# Patient Record
Sex: MALE | Race: ASIAN | NOT HISPANIC OR LATINO | Employment: OTHER | URBAN - METROPOLITAN AREA
[De-identification: names, ages, dates, MRNs, and addresses within clinical notes are randomized per-mention and may not be internally consistent; named-entity substitution may affect disease eponyms.]

---

## 2019-08-13 PROBLEM — E23.0 PANHYPOPITUITARISM: Status: ACTIVE | Noted: 2019-08-13

## 2019-08-13 PROBLEM — E78.2 MIXED HYPERLIPIDEMIA: Status: ACTIVE | Noted: 2019-08-13

## 2020-05-01 ENCOUNTER — TELEPHONE (OUTPATIENT)
Dept: PHARMACY | Facility: CLINIC | Age: 30
End: 2020-05-01

## 2020-05-01 NOTE — TELEPHONE ENCOUNTER
Informed Patient  that [facility] received prescription for Epidiolex and prior authorization is required.  [facility] will be back in touch once insurance determination is received.

## 2020-05-06 NOTE — TELEPHONE ENCOUNTER
Initial consult for Epidiolex completed with patient's mother Shahida on 20. Medication will be sent via FedEx on  to arrive on  with patient consent. Address verified, acknowledged signature required. 28 day supply. $ 0 copay. First dose anticipated on . Confirmed 2 patient identifiers - name and . Therapy Appropriate.  verified.    Indication: treatment of seizure disorders  Goals of Treatment: reduction in seizure activity  Current seizure frequency: 6-8 month in clusters of 2-3  Missed days of school/work: N/A PT and OT only    Counseled patient on administration directions:  · Dose:  · Initial dose 175mg BID (2.2mg/kg):  · No titration at this time - mother aware of titration approach for future.   · When discontinuing, the dose should be decreased gradually; avoid abrupt discontinuation.   Administration: Oral;   o Food may affect Epidiolex levels; Administer consistently in a fasted or fed state, at approximately the same times each day.   o Administer using the provided 1 or 5 mL oral syringe NOT household spoon.   o 100ml bottles with clear - yellow solution  o Strawberry flavored (artificial)   Storage/Stability: Store at ROOM TEMPERATURE. Do not refrigerate or freeze. Use within 12 weeks of first opening the bottle; discard any unused solution.    Patient was counseled on possible side effects which include, but are not limited to:  o Central nervous system: Drowsiness (?32%), sedation (?32%), fatigue, sleep disturbance (?11%) caution with other CNS depressants   o Depression and Suicidal Ideation, monitor for changes in mood  o Dermatologic: Skin rash (7% to 13%)  o Decreased appetite (16% to 22%), diarrhea (9% to 20%), Weight loss (3% to 18%)  o Changes in liver enzymes:   - Monitoring: LFTs (baseline and 1, 3, and 6 months after initiation, then as needed).  - Signs/Symptoms: yellowing of the skin or the whites of eyes, unusual darkening of urine, right upper stomach pain, N/V,  fever, unusual tiredness  o Infection: Infection (25% to 41%)  o Central nervous system: Agitation (?9%), irritability (?9%)  o Hypersensitivity: Hypersensitivity reaction (Frequency not defined)    DDIs:  Medication list reviewed. No significant DDIs with Epidiolex encountered    Patient informed of [facility] hours, refill procedures, and on-call pharmacist 24 hours a day. Patient verbalized understanding. Patient advised to call myself or provider should any questions arise. Consultation included: the importance of compliance; the importance of laboratory monitoring; the importance of keeping all follow up appointments.  Patient understands to report any medication changes to [facility] and provider. All questions answered and addressed to patients satisfaction. [facility] to contact patient in 3 weeks for refills.

## 2020-05-07 NOTE — TELEPHONE ENCOUNTER
Spoke to mother Shahida and provided with lab phone number [phone] and address [address] [place] [address]. Confirmed that orders are in and patient can walk in for appt at anytime. Mother acknowledged that they will not start the Epidiolex until lab results received.

## 2020-07-01 ENCOUNTER — TELEPHONE (OUTPATIENT)
Dept: PHARMACY | Facility: CLINIC | Age: 30
End: 2020-07-01

## 2020-07-01 NOTE — TELEPHONE ENCOUNTER
Refill and followup call for Epidiolex. Patient confirmed need of the refill. Will deliver via FedEx on  to arrive on  with patient consent. Copay $0.00 at 004. Address confirmed. Patient has 18 doses remaining (9 day supply). Patient denies missed doses and no side effects.  No new medications/allergies/medical conditions. Labs are stable. No ER/Urgent care visits in past month. Patient taking the medication as directed. Patient denies any further questions. Confirmed 2 patient identifiers - Name and .  verified.    Magdi Stewart, PharmD  Clinical Pharmacist  [facility]  P: [phone]

## 2020-07-29 ENCOUNTER — TELEPHONE (OUTPATIENT)
Dept: PHARMACY | Facility: CLINIC | Age: 30
End: 2020-07-29

## 2020-07-29 NOTE — TELEPHONE ENCOUNTER
Call for Epidiolex refill. Mother answered, verified patient name and . Rx currently out of refills, sent refill request electronically and called for verbal refill request and left message. Mother states that patient has about 1/3rd of a bottle remaining, 9 days of medication left. Informed mother that [facility] would call back once refill was received. Mother acknowledged. Mother asked what the difference was with Epidiolex and OTC CBD oil. Explained that OTC CBD oil has not been guaranteed for purity and strength, but that they could be otherwise similar. Mother states that she prefers something that she knows will work and be the same strength every time. No other questions or concerns.     Magdi Stewart, PharmD  Clinical Pharmacist  [facility]  P: [phone]

## 2020-07-31 NOTE — TELEPHONE ENCOUNTER
Refill and followup call for Epidiolex. Patient's mother confirmed need of the refill. Will deliver via FedEx on  to arrive on  with patient consent. Copay $0.00 at 004. Address confirmed. Patient has 14 doses remaining (7 day supply). Mother denies missed doses and no side effects.  No new medications/allergies/medical conditions. Labs are stable. No ER/Urgent care visits in past month. Patient taking the medication as directed. Mother denies any further questions. Confirmed 2 patient identifiers - Name and .  verified.    Magdi Stewart, PharmD  Clinical Pharmacist  [facility]  P: [phone]

## 2020-08-17 PROBLEM — Z96.89 S/P PLACEMENT OF VNS (VAGUS NERVE STIMULATION) DEVICE: Status: ACTIVE | Noted: 2020-08-17

## 2020-08-17 PROBLEM — G47.19: Status: ACTIVE | Noted: 2020-08-17

## 2020-08-17 PROBLEM — G40.911 INTRACTABLE EPILEPSY WITH STATUS EPILEPTICUS: Status: ACTIVE | Noted: 2020-08-17

## 2020-08-17 PROBLEM — R41.89 COGNITIVE IMPAIRMENT: Status: ACTIVE | Noted: 2020-08-17

## 2020-08-26 ENCOUNTER — TELEPHONE (OUTPATIENT)
Dept: PHARMACY | Facility: CLINIC | Age: 30
End: 2020-08-26

## 2020-08-26 NOTE — TELEPHONE ENCOUNTER
Refill call for Epidiolex. Patient confirmed need of the refill. Will deliver via FedEx on  to arrive on  with patient consent- confirmed. Signature required Copay $ 0 at 004. Address confirmed. Patient has 16 doses remaining (8 day supply). Patient denies missed doses and no side effects.  No new medications/allergies/medical conditions. No ER/Urgent care visits in past month. Patient taking the medication as directed. Patient denies any further questions. Confirmed 2 patient identifiers - Name and .  verified.    Francis Howard, PharmD  [facility]

## 2020-09-24 ENCOUNTER — TELEPHONE (OUTPATIENT)
Dept: PHARMACY | Facility: CLINIC | Age: 30
End: 2020-09-24

## 2020-09-24 NOTE — TELEPHONE ENCOUNTER
Refill call for Epidiolex. Patient confirmed need of the refill. Will deliver via FedEx on  to arrive on  with patient consent. Copay $0.00 at 004. Address confirmed. Patient has 33ml remaining (9 day supply). Patient denies missed doses and no side effects.  No new medications/allergies/medical conditions. Labs are stable, due for labs at 6 months. Will message provider. No ER/Urgent care visits in past month. Patient taking the medication as directed. Patient denies any further questions. Confirmed 2 patient identifiers - Name and .    Magdi Stewart, PharmD  Clinical Pharmacist  [facility]  P: [phone]

## 2020-10-23 ENCOUNTER — SPECIALTY PHARMACY (OUTPATIENT)
Dept: PHARMACY | Facility: CLINIC | Age: 30
End: 2020-10-23

## 2020-10-23 NOTE — TELEPHONE ENCOUNTER
Upon speaking with patient's mother for Epidiolex refill, she reports a half bottle on hand at this time which is a 14 a day supply. Patient's mother denies any missed doses and states we have always sent it a week early, so that is why they have extra. She agreed for [facility] call back in a week to schedule the refill.    Romero Dobbins, PharmD  Clinical Pharmacist   [facility]   P: [phone]

## 2020-11-02 ENCOUNTER — SPECIALTY PHARMACY (OUTPATIENT)
Dept: PHARMACY | Facility: CLINIC | Age: 30
End: 2020-11-02

## 2020-11-02 DIAGNOSIS — G40.211 PARTIAL SYMPTOMATIC EPILEPSY WITH COMPLEX PARTIAL SEIZURES, INTRACTABLE, WITH STATUS EPILEPTICUS: Primary | ICD-10-CM

## 2020-11-02 NOTE — TELEPHONE ENCOUNTER
Specialty Pharmacy - Refill Coordination    Specialty Medication Orders Linked to Encounter      Most Recent Value   Medication #1  cannabidioL (EPIDIOLEX) 100 mg/mL (Order#[id number], Rx#[id number])        Refill Questions - Documented Responses      Most Recent Value   Relationship to patient of person spoken to?  Mother   HIPAA/medical authority confirmed?  Yes   Any changes in contact preferences or allowed representatives?  No   Has the patient had any insurance changes?  No   Has the patient had any changes to specialty medication, dose, or instructions?  No   Has the patient started taking any new medications, herbals, or supplements?  No   Has the patient been diagnosed with any new medical conditions?  No   Does the patient have any new allergies to medications or foods?  No   Does the patient have any concerns about side effects?  No   Can the patient store medication/sharps container properly (at the correct temperature, away from children/pets, etc.)?  Yes   Can the patient call emergency services (911) in the event of an emergency?  Yes   Does the patient have any concerns or questions about taking or administering this medication as prescribed?  No   How many doses did the patient miss in the past 4 weeks or since the last fill?  0   How many doses does the patient have on hand?  14   How many days does the patient report on hand quantity will last?  7   Does the number of doses/days supply remaining match pharmacy expected amounts?  Yes   How will the patient receive the medication?  Mail   When does the patient need to receive the medication?  11/09/20   Shipping Address  Home   Address in [facility] confirmed and updated if neccessary?  Yes   Expected Copay ($)  0   Is the patient able to afford the medication copay?  Yes   Payment Method  zero copay   Days supply of Refill  28   Would patient like to speak to a pharmacist?  No   Do you want to trigger an intervention?  No   Do you want to  trigger an additional referral task?  No   Refill activity completed?  Yes   Refill activity plan  Refill scheduled   Shipment/Pickup Date:  11/04/20        Tasks added this encounter   11/30/2020 - Refill Call (Auto Added)   Tasks due within next 3 months   11/6/2020 - Clinical - Follow Up Assesement (90 day)     Patient's mother reports about a quarter of a bottle remaining on hand at this time (25 mLs or 7 days of therapy).  checked. No questions or concerns.     Romero Dobbins, Brenna  [facility]  [address] [place] [id number]  Phone: [phone]  Fax: [phone]

## 2020-11-06 ENCOUNTER — SPECIALTY PHARMACY (OUTPATIENT)
Dept: PHARMACY | Facility: CLINIC | Age: 30
End: 2020-11-06

## 2020-11-06 DIAGNOSIS — G40.211 PARTIAL SYMPTOMATIC EPILEPSY WITH COMPLEX PARTIAL SEIZURES, INTRACTABLE, WITH STATUS EPILEPTICUS: Primary | ICD-10-CM

## 2020-11-06 NOTE — TELEPHONE ENCOUNTER
"Specialty Pharmacy - Clinical Reassessment    Specialty Medication Orders Linked to Encounter      Most Recent Value   Medication #1  cannabidioL (EPIDIOLEX) 100 mg/mL (Order#[id number], Rx#[id number])        Subjective    Ciro Sloan is a 30 y.o. male, who is followed by the specialty pharmacy service for management and education.    Encounters since last clinical assessment   10/23/2020: Refill Coordination with Romero Dobbins PharmD  11/2/2020: Refill Coordination with Romero Dobbins PharmD   Clinical call attempts since last clinical assessment   No call attempts found.     Today he received follow up education for his specialty medication(s).    Current Outpatient Medications   Medication Sig    cannabidioL (EPIDIOLEX) 100 mg/mL Take 1.75 mLs (175 mg total) by mouth 2 (two) times a day.    cloBAZam (ONFI) 20 mg Tab Take 1 tablet (20 mg total) by mouth 2 (two) times daily.    clonazePAM (KLONOPIN) 2 MG Tab Take 1 tablet (2 mg total) by mouth once as needed (For prolonged seizures or cluster seizures).    hydrocortisone (CORTEF) 5 MG Tab Take 1 tablet (5 mg total) by mouth 3 (three) times daily.    lamotrigine XR (LAMICTAL XR) 300 mg XR tablet Take 1 tablet (300 mg total) by mouth 2 (two) times a day.    levothyroxine (SYNTHROID) 137 MCG Tab tablet Take 1 tablet (137 mcg total) by mouth before breakfast.    loratadine (CLARITIN) 10 mg tablet Take 10 mg by mouth.    melatonin 10 mg Tab Take 10 mg by mouth.    methylphenidate HCl (RITALIN) 20 MG tablet Take 1 tablet (20 mg total) by mouth once daily AND 2 tablets (40 mg total) with lunch. Fill prescription no sooner than  10/13/2020.    multivitamin (THERAGRAN) per tablet Take 1 tablet by mouth.    needle, disp, 18 G (BD PRECISIONGLIDE NON-STERILE) 18 gauge x 1 1/2" Ndle 1 Units by Misc.(Non-Drug; Combo Route) route 2 (two) times daily.    needle, disp, 23 gauge 23 gauge x 1 1/2" Ndle 1 Units by Misc.(Non-Drug; Combo Route) route 2 (two) times " daily.    omeprazole (PRILOSEC) 20 MG capsule Take 20 mg by mouth.    testosterone cypionate (DEPOTESTOTERONE CYPIONATE) 100 mg/mL injection Inject 150 mg into the muscle.    VIMPAT 200 mg Tab tablet Take 1 tablet (200 mg total) by mouth every 12 (twelve) hours.    vitamin D (VITAMIN D3) 1000 units Tab Take 1,000 Units by mouth.   Last reviewed on 11/6/2020 12:21 PM by Romero Dobbins, PharmD    Review of patient's allergies indicates:  No Known AllergiesLast reviewed on  11/6/2020 12:20 PM by Romero Dobbins    Drug Interactions    Drug interactions evaluated: yes  Clinically relevant drug interactions identified: no  Provided the patient with educational material regarding drug interactions: not applicable       Medication Adherence    Patient reported X missed doses in the last month: 0  Any gaps in refill history greater than 2 weeks in the last 3 months: no  Demonstrates understanding of importance of adherence: yes  Informant: mother  Reliability of informant: reliable  Provider-estimated medication adherence level: 0-25%  Reasons for non-adherence: no problems identified  Support network for adherence: family member  Confirmed plan for next specialty medication refill: delivery by pharmacy  Refills needed for supportive medications: not needed       Adverse Effects    *All other systems reviewed and are negative       Assessment Questions - Documented Responses      Most Recent Value   Assessment   Medication Reconciliation completed for patient  Yes   During the past 4 weeks, has patient missed any activities due to condition or medication?  No   During the past 4 weeks, did patient have any of the following urgent care visits?  None   Goals of Therapy Status  Achieving   Welcome packet contents reviewed and discussed with patient?  No   Assesment completed?  Yes   Plan  Therapy continued   Do you need to open a clinical intervention (i-vent)?  No   Do you want to schedule first shipment?  No    Medication #1 Assessment Info   Patient status  Existing medication   Is this medication appropriate for the patient?  Yes   Is this medication effective?  Yes        Objective    He has a past medical history of Adrenal insufficiency, Epilepsy, Hearing impaired, Hydrocephalus, Hyperlipidemia, Hypopituitarism, Hypothyroidism, Medulloblastoma, and Short-term memory loss.    BP Readings from Last 4 Encounters:   08/17/20 119/76   03/02/20 116/76   08/12/19 107/76     Ht Readings from Last 4 Encounters:   08/17/20 6' (1.829 m)   03/02/20 6' (1.829 m)   08/12/19 6' (1.829 m)     Wt Readings from Last 4 Encounters:   08/17/20 83.9 kg (185 lb)   03/02/20 79.4 kg (175 lb)   08/12/19 76.2 kg (168 lb)       The goals of prescribed drug therapy management include:  · Supporting patient to meet the prescriber's medical treatment objectives  · Improving or maintaining quality of life  · Maintaining optimal therapy adherence  · Minimizing and managing side effects      Goals of Therapy Status: Achieving    Assessment/Plan  Patient plans to continue therapy without changes      Indication, dosage, appropriateness, effectiveness, safety and convenience of his specialty medication(s) were reviewed today.     Patient Counseling    Counseled the patient on the following: lab monitoring and follow-up discussed       Clinical follow up completed with patient's mother, Shahida. Patient is taking the medication as prescribed (1.75 mLs BID). No adherence issues noted. Patient's mother denies any side effects experienced including drowsiness, skin rash, irritability/anger, infection, or diarrhea. In regards to his seizures, patient has seen significant improvement since starting Epidiolex. Frequency at baseline was 6-8 seizures per month. Now the patient goes 21-30 days between seizure episodes. Additionally, patient's mother reports his N/V has completely resolved and he has not thrown up once while on Epidiolex therapy. This was a big  issue impacting the patient's QOL previously and his mother is extremely happy it is no longer occurring. She also notes improvement in his thought process as he is not having as many seizures. Overall, patient doing well clinically. Pertinent labs last completed on 11/4/20 - LFTs WNL (AST 13 ALT 25). Informed patient's mother labs will continued to be monitored on routine basis. Medication list reviewed and reconciled - no longer taking Restoril (Klonopin has replaced) and Periactin (no longer having N/V). Allergies and conditions reviewed. No CIs or DDIs encountered. Therapy appropriate to continue to positive clinical response. Patient's mother had no further questions or concerns and declined further consult/disease education.      Tasks added this encounter   1/28/2021 - Clinical - Follow Up Assesement (90 day)   Tasks due within next 3 months   11/30/2020 - Refill Call (Auto Added)     Romero Dobbins PharmD  [facility]  [address] [place] [id number]  Phone: [phone]  Fax: [phone]

## 2020-11-30 ENCOUNTER — SPECIALTY PHARMACY (OUTPATIENT)
Dept: PHARMACY | Facility: CLINIC | Age: 30
End: 2020-11-30

## 2020-11-30 DIAGNOSIS — G40.211 PARTIAL SYMPTOMATIC EPILEPSY WITH COMPLEX PARTIAL SEIZURES, INTRACTABLE, WITH STATUS EPILEPTICUS: Primary | ICD-10-CM

## 2020-11-30 NOTE — TELEPHONE ENCOUNTER
Specialty Pharmacy - Refill Coordination    Specialty Medication Orders Linked to Encounter      Most Recent Value   Medication #1  cannabidioL (EPIDIOLEX) 100 mg/mL (Order#[id number], Rx#[id number])        Refill Questions - Documented Responses      Most Recent Value   HIPAA/medical authority confirmed?  Yes   Any changes in contact preferences or allowed representatives?  No   Has the patient had any insurance changes?  No   Has the patient had any changes to specialty medication, dose, or instructions?  No   Has the patient started taking any new medications, herbals, or supplements?  No   Has the patient been diagnosed with any new medical conditions?  No   Does the patient have any new allergies to medications or foods?  No   Does the patient have any concerns about side effects?  No   Can the patient store medication/sharps container properly (at the correct temperature, away from children/pets, etc.)?  Yes   Can the patient call emergency services (911) in the event of an emergency?  Yes   Does the patient have any concerns or questions about taking or administering this medication as prescribed?  No   How many doses did the patient miss in the past 4 weeks or since the last fill?  0   How many doses does the patient have on hand?  18   How many days does the patient report on hand quantity will last?  9   Does the number of doses/days supply remaining match pharmacy expected amounts?  Yes   Does the patient feel that this medication is effective?  Yes   During the past 4 weeks, has patient missed any activities due to condition or medication?  No   During the past 4 weeks, did patient have any of the following urgent care visits?  None   How will the patient receive the medication?  Mail   When does the patient need to receive the medication?  12/09/20   Shipping Address  Home   Address in [facility] confirmed and updated if neccessary?  Yes   Expected Copay ($)  0   Is the patient able to afford the  medication copay?  Yes   Payment Method  zero copay   Days supply of Refill  28   Would patient like to speak to a pharmacist?  No   Do you want to trigger an intervention?  No   Do you want to trigger an additional referral task?  No   Refill activity completed?  Yes   Refill activity plan  Refill scheduled   Shipment/Pickup Date:  12/03/20        Tasks added this encounter   12/30/2020 - Refill Call (Auto Added)   Tasks due within next 3 months   1/28/2021 - Clinical - Follow Up Assesement (90 day)     Romero Dobbins PharmD  [facility]  [address] [place] [id number]  Phone: [phone]  Fax: [phone]

## 2020-12-30 ENCOUNTER — SPECIALTY PHARMACY (OUTPATIENT)
Dept: PHARMACY | Facility: CLINIC | Age: 30
End: 2020-12-30

## 2020-12-30 DIAGNOSIS — G40.211 PARTIAL SYMPTOMATIC EPILEPSY WITH COMPLEX PARTIAL SEIZURES, INTRACTABLE, WITH STATUS EPILEPTICUS: Primary | ICD-10-CM

## 2020-12-30 NOTE — TELEPHONE ENCOUNTER
Informed patient's mother that Epidiolex Rx is now requiring a PA. She reports having approximately 1/3rd bottle remaining on hand at this time (~7 days). Completed PA over the phone as URGENT and rep states there is a turnaround time of 24 hours.    PA Case ID: [id number]

## 2020-12-31 NOTE — TELEPHONE ENCOUNTER
Specialty Pharmacy - Refill Coordination  Specialty Pharmacy - Medication/Referral Authorization    Specialty Medication Orders Linked to Encounter      Most Recent Value   Medication #1  cannabidioL (EPIDIOLEX) 100 mg/mL (Order#[id number], Rx#[id number])        Refill Questions - Documented Responses      Most Recent Value   Relationship to patient of person spoken to?  Mother   HIPAA/medical authority confirmed?  Yes   Any changes in contact preferences or allowed representatives?  No   Has the patient had any insurance changes?  No   Has the patient had any changes to specialty medication, dose, or instructions?  No   Has the patient started taking any new medications, herbals, or supplements?  No   Has the patient been diagnosed with any new medical conditions?  No   Does the patient have any new allergies to medications or foods?  No   Does the patient have any concerns about side effects?  No   Can the patient store medication/sharps container properly (at the correct temperature, away from children/pets, etc.)?  Yes   Can the patient call emergency services (911) in the event of an emergency?  Yes   Does the patient have any concerns or questions about taking or administering this medication as prescribed?  No   How many doses did the patient miss in the past 4 weeks or since the last fill?  0   How many doses does the patient have on hand?  12   How many days does the patient report on hand quantity will last?  6   Does the number of doses/days supply remaining match pharmacy expected amounts?  Yes   Does the patient feel that this medication is effective?  Yes   During the past 4 weeks, has patient missed any activities due to condition or medication?  No   During the past 4 weeks, did patient have any of the following urgent care visits?  None   How will the patient receive the medication?  Mail   When does the patient need to receive the medication?  01/06/21   Shipping Address  Home   Address in [facility] confirmed and updated if neccessary?  Yes   Expected Copay ($)  0   Is the patient able to afford the medication copay?  Yes   Payment Method  zero copay   Days supply of Refill  28   Would patient like to speak to a pharmacist?  No   Do you want to trigger an intervention?  No   Do you want to trigger an additional referral task?  No   Refill activity completed?  Yes   Refill activity plan  Refill scheduled   Shipment/Pickup Date:  01/04/21        Romero Dobbins PharmD  [facility]  [address] [place] [id number]  Phone: [phone]  Fax: [phone]

## 2021-02-03 ENCOUNTER — SPECIALTY PHARMACY (OUTPATIENT)
Dept: PHARMACY | Facility: CLINIC | Age: 31
End: 2021-02-03

## 2021-02-03 DIAGNOSIS — G40.211 PARTIAL SYMPTOMATIC EPILEPSY WITH COMPLEX PARTIAL SEIZURES, INTRACTABLE, WITH STATUS EPILEPTICUS: Primary | ICD-10-CM

## 2021-03-05 ENCOUNTER — SPECIALTY PHARMACY (OUTPATIENT)
Dept: PHARMACY | Facility: CLINIC | Age: 31
End: 2021-03-05

## 2021-03-05 DIAGNOSIS — G40.211 PARTIAL SYMPTOMATIC EPILEPSY WITH COMPLEX PARTIAL SEIZURES, INTRACTABLE, WITH STATUS EPILEPTICUS: Primary | ICD-10-CM

## 2021-04-01 ENCOUNTER — SPECIALTY PHARMACY (OUTPATIENT)
Dept: PHARMACY | Facility: CLINIC | Age: 31
End: 2021-04-01

## 2021-04-01 DIAGNOSIS — G40.211 PARTIAL SYMPTOMATIC EPILEPSY WITH COMPLEX PARTIAL SEIZURES, INTRACTABLE, WITH STATUS EPILEPTICUS: Primary | ICD-10-CM

## 2021-05-07 ENCOUNTER — SPECIALTY PHARMACY (OUTPATIENT)
Dept: PHARMACY | Facility: CLINIC | Age: 31
End: 2021-05-07

## 2021-05-07 DIAGNOSIS — G40.211 PARTIAL SYMPTOMATIC EPILEPSY WITH COMPLEX PARTIAL SEIZURES, INTRACTABLE, WITH STATUS EPILEPTICUS: Primary | ICD-10-CM

## 2021-06-11 ENCOUNTER — SPECIALTY PHARMACY (OUTPATIENT)
Dept: PHARMACY | Facility: CLINIC | Age: 31
End: 2021-06-11

## 2021-06-11 DIAGNOSIS — G40.211 PARTIAL SYMPTOMATIC EPILEPSY WITH COMPLEX PARTIAL SEIZURES, INTRACTABLE, WITH STATUS EPILEPTICUS: Primary | ICD-10-CM

## 2021-06-22 PROBLEM — G40.919 REFRACTORY EPILEPSY: Status: ACTIVE | Noted: 2020-08-17

## 2021-07-16 ENCOUNTER — SPECIALTY PHARMACY (OUTPATIENT)
Dept: PHARMACY | Facility: CLINIC | Age: 31
End: 2021-07-16

## 2021-07-16 DIAGNOSIS — G40.919 REFRACTORY EPILEPSY: Primary | ICD-10-CM

## 2021-08-13 ENCOUNTER — SPECIALTY PHARMACY (OUTPATIENT)
Dept: PHARMACY | Facility: CLINIC | Age: 31
End: 2021-08-13

## 2021-09-15 ENCOUNTER — SPECIALTY PHARMACY (OUTPATIENT)
Dept: PHARMACY | Facility: CLINIC | Age: 31
End: 2021-09-15

## 2021-09-17 ENCOUNTER — SPECIALTY PHARMACY (OUTPATIENT)
Dept: PHARMACY | Facility: CLINIC | Age: 31
End: 2021-09-17

## 2021-10-15 ENCOUNTER — SPECIALTY PHARMACY (OUTPATIENT)
Dept: PHARMACY | Facility: CLINIC | Age: 31
End: 2021-10-15

## 2021-10-15 DIAGNOSIS — G40.919 REFRACTORY EPILEPSY: Primary | ICD-10-CM

## 2021-11-15 ENCOUNTER — SPECIALTY PHARMACY (OUTPATIENT)
Dept: PHARMACY | Facility: CLINIC | Age: 31
End: 2021-11-15

## 2021-11-15 DIAGNOSIS — G40.919 REFRACTORY EPILEPSY: Primary | ICD-10-CM

## 2021-12-13 ENCOUNTER — SPECIALTY PHARMACY (OUTPATIENT)
Dept: PHARMACY | Facility: CLINIC | Age: 31
End: 2021-12-13

## 2021-12-13 DIAGNOSIS — G40.919 REFRACTORY EPILEPSY: Primary | ICD-10-CM

## 2022-01-10 PROBLEM — E55.9 VITAMIN D DEFICIENCY: Status: ACTIVE | Noted: 2022-01-10

## 2022-01-10 PROBLEM — E03.8 SECONDARY HYPOTHYROIDISM: Status: ACTIVE | Noted: 2022-01-10

## 2022-01-10 PROBLEM — E27.49 SECONDARY ADRENAL INSUFFICIENCY: Status: ACTIVE | Noted: 2022-01-10

## 2022-01-10 PROBLEM — E23.0 HYPOGONADOTROPIC HYPOGONADISM: Status: ACTIVE | Noted: 2022-01-10

## 2022-01-12 ENCOUNTER — SPECIALTY PHARMACY (OUTPATIENT)
Dept: PHARMACY | Facility: CLINIC | Age: 32
End: 2022-01-12

## 2022-01-12 DIAGNOSIS — G40.919 REFRACTORY EPILEPSY: Primary | ICD-10-CM

## 2022-01-12 NOTE — TELEPHONE ENCOUNTER
Specialty Pharmacy - Refill Coordination    Specialty Medication Orders Linked to Encounter    Flowsheet Row Most Recent Value   Medication #1 cannabidioL (EPIDIOLEX) 100 mg/mL (Order#[id number], Rx#[id number])        Refill Questions - Documented Responses    Flowsheet Row Most Recent Value   Patient Availability and HIPAA Verification    Does patient want to proceed with activity? Yes   HIPAA/medical authority confirmed? Yes   Relationship to patient of person spoken to? Mother   Refill Screening Questions    Changes to allergies? No   Changes to medications? No   New conditions since last clinic visit? No   Unplanned office visit, urgent care, ED, or hospital admission in the last 4 weeks? No   How does patient/caregiver feel medication is working? Very good   Financial problems or insurance changes? No   How many doses of your specialty medications were missed in the last 4 weeks? 0   Would patient like to speak to a pharmacist? No   When does the patient need to receive the medication? 01/19/22   Refill Delivery Questions    How will the patient receive the medication? Mail   When does the patient need to receive the medication? 01/19/22   Shipping Address Home   Address in [facility] confirmed and updated if neccessary? Yes   Expected Copay ($) 0   Is the patient able to afford the medication copay? Yes   Payment Method zero copay   Days supply of Refill 28   Supplies needed? No supplies needed   Refill activity completed? Yes   Refill activity plan Refill scheduled   Shipment/Pickup Date: 01/13/22          Current Outpatient Medications   Medication Sig    cannabidioL (EPIDIOLEX) 100 mg/mL Take 1.75 mLs (175 mg total) by mouth 2 (two) times a day.    cloBAZam (ONFI) 20 mg Tab TAKE 1 TABLET(20 MG) BY MOUTH TWICE DAILY    clonazePAM (KLONOPIN) 2 MG Tab TAKE 1 TABLET BY MOUTH ONCE AS NEEDED FOR PROLONGED SEIZURES OR CLUSTER SEIZURES    hydrocortisone (CORTEF) 5 MG Tab Take 10 mg (2 tablets) in am and  "5 mg in pm (3 pm)    lamotrigine XR (LAMICTAL XR) 300 mg XR tablet TAKE 1 TABLET BY MOUTH TWICE DAILY    levothyroxine (SYNTHROID) 150 MCG tablet Take 1 tablet (150 mcg total) by mouth before breakfast.    loratadine (CLARITIN) 10 mg tablet Take 10 mg by mouth.    melatonin 10 mg Tab Take 10 mg by mouth.    methylphenidate HCl (RITALIN) 20 MG tablet Take 1 tablet (20 mg total) by mouth once daily AND 2 tablets (40 mg total) with lunch.    multivitamin (THERAGRAN) per tablet Take 1 tablet by mouth.    needle, disp, 18 G (BD PRECISIONGLIDE NON-STERILE) 18 gauge x 1 1/2" Ndle 1 Units by Misc.(Non-Drug; Combo Route) route 2 (two) times daily.    needle, disp, 23 gauge 23 gauge x 1 1/2" Ndle 1 Units by Misc.(Non-Drug; Combo Route) route 2 (two) times daily.    omeprazole (PRILOSEC) 20 MG capsule Take 20 mg by mouth.    rosuvastatin (CRESTOR) 20 MG tablet Take 20 mg by mouth once daily.    VIMPAT 200 mg Tab tablet Take 1 tablet (200 mg total) by mouth every 12 (twelve) hours.    vitamin D (VITAMIN D3) 1000 units Tab Take 1,000 Units by mouth.   Last reviewed on 1/10/2022  2:27 PM by Chaitanya Lamb MD    Review of patient's allergies indicates:  No Known Allergies Last reviewed on  1/10/2022 2:27 PM by Chaitanya Lamb      Tasks added this encounter   2/9/2022 - Refill Call (Auto Added)   Tasks due within next 3 months   No tasks due.     Romero Dobbins, PharmD  [facility] - [facility]ty Pharm[facility]  [address] [place] [id number]  Phone: [phone]  Fax: [phone]      "

## 2022-02-09 ENCOUNTER — SPECIALTY PHARMACY (OUTPATIENT)
Dept: PHARMACY | Facility: CLINIC | Age: 32
End: 2022-02-09

## 2022-02-09 NOTE — TELEPHONE ENCOUNTER
Patient's mother reports 75mL on hand (21 days). She denies any missed doses and confirms appropriate dose of 1.75mL BID.  She states there must have been more in the bottle. Patient's mother agreed for a call back in 2 weeks to schedule. Pending accordingly.

## 2022-02-23 ENCOUNTER — SPECIALTY PHARMACY (OUTPATIENT)
Dept: PHARMACY | Facility: CLINIC | Age: 32
End: 2022-02-23

## 2022-02-23 NOTE — TELEPHONE ENCOUNTER
Specialty Pharmacy - Refill Coordination    Specialty Medication Orders Linked to Encounter    Flowsheet Row Most Recent Value   Medication #1 cannabidioL (EPIDIOLEX) 100 mg/mL (Order#[id number], Rx#[id number])        Refill Questions - Documented Responses    Flowsheet Row Most Recent Value   Patient Availability and HIPAA Verification    Does patient want to proceed with activity? Yes   HIPAA/medical authority confirmed? Yes   Relationship to patient of person spoken to? Mother   Refill Screening Questions    Changes to allergies? No   Changes to medications? No   New conditions since last clinic visit? No   Unplanned office visit, urgent care, ED, or hospital admission in the last 4 weeks? No   How does patient/caregiver feel medication is working? Good   Financial problems or insurance changes? No   How many doses of your specialty medications were missed in the last 4 weeks? 0   Would patient like to speak to a pharmacist? No   When does the patient need to receive the medication? 03/04/22   Refill Delivery Questions    How will the patient receive the medication? Mail   When does the patient need to receive the medication? 03/04/22   Shipping Address Home   Address in [facility] confirmed and updated if neccessary? Yes   Expected Copay ($) 0   Is the patient able to afford the medication copay? Yes   Payment Method zero copay   Days supply of Refill 28   Supplies needed? No supplies needed   Refill activity completed? Yes   Refill activity plan Refill scheduled   Shipment/Pickup Date: 02/28/22          Current Outpatient Medications   Medication Sig    cannabidioL (EPIDIOLEX) 100 mg/mL Take 1.75 mLs (175 mg total) by mouth 2 (two) times a day.    cloBAZam (ONFI) 20 mg Tab TAKE 1 TABLET(20 MG) BY MOUTH TWICE DAILY    clonazePAM (KLONOPIN) 2 MG Tab TAKE 1 TABLET BY MOUTH ONCE AS NEEDED FOR PROLONGED SEIZURES OR CLUSTER SEIZURES    hydrocortisone (CORTEF) 5 MG Tab Take 10 mg (2 tablets) in am and 5 mg  "in pm (3 pm)    lamotrigine XR (LAMICTAL XR) 300 mg XR tablet TAKE 1 TABLET BY MOUTH TWICE DAILY    levothyroxine (SYNTHROID) 150 MCG tablet Take 1 tablet (150 mcg total) by mouth before breakfast.    loratadine (CLARITIN) 10 mg tablet Take 10 mg by mouth.    melatonin 10 mg Tab Take 10 mg by mouth.    methylphenidate HCl (RITALIN) 20 MG tablet Take 1 tablet (20 mg total) by mouth once daily AND 2 tablets (40 mg total) with lunch.    midazolam (NAYZILAM) 5 mg/spray (0.1 mL) Spry 5 mg by Nasal route as needed (to break seizure cluster. May repeat dose once at least 10 minutes after 1st dose.).    multivitamin (THERAGRAN) per tablet Take 1 tablet by mouth.    needle, disp, 18 G (BD PRECISIONGLIDE NON-STERILE) 18 gauge x 1 1/2" Ndle 1 Units by Misc.(Non-Drug; Combo Route) route 2 (two) times daily.    needle, disp, 23 gauge 23 gauge x 1 1/2" Ndle 1 Units by Misc.(Non-Drug; Combo Route) route 2 (two) times daily.    omeprazole (PRILOSEC) 20 MG capsule Take 20 mg by mouth.    rosuvastatin (CRESTOR) 20 MG tablet Take 20 mg by mouth once daily.    VIMPAT 200 mg Tab tablet TAKE 1 TABLET(200 MG) BY MOUTH EVERY 12 HOURS    vitamin D (VITAMIN D3) 1000 units Tab Take 1,000 Units by mouth.   Last reviewed on 1/10/2022  2:27 PM by Chaitanya Lamb MD    Review of patient's allergies indicates:  No Known Allergies Last reviewed on  1/18/2022 9:45 AM by Josefa Tellse      Tasks added this encounter   3/25/2022 - Refill Call (Auto Added)   Tasks due within next 3 months   No tasks due.     Romero Dobbins, PharmD  [facility]  [address] [place] [id number]  Phone: [phone]  Fax: [phone]      "

## 2022-03-28 ENCOUNTER — SPECIALTY PHARMACY (OUTPATIENT)
Dept: PHARMACY | Facility: CLINIC | Age: 32
End: 2022-03-28

## 2022-03-28 NOTE — TELEPHONE ENCOUNTER
Epidiolex refills received. Dose appropriate. Updated labs not required at this time. No PA required. Releasing Rx to [facility].    Labs 3/4/22: LFTs (AST 17 AST 15) WNL

## 2022-03-29 ENCOUNTER — SPECIALTY PHARMACY (OUTPATIENT)
Dept: PHARMACY | Facility: CLINIC | Age: 32
End: 2022-03-29

## 2022-03-29 NOTE — TELEPHONE ENCOUNTER
Specialty Pharmacy - Refill Coordination    Specialty Medication Orders Linked to Encounter    Flowsheet Row Most Recent Value   Medication #1 cannabidioL (EPIDIOLEX) 100 mg/mL (Order#[id number], Rx#[id number])          Refill Questions - Documented Responses    Flowsheet Row Most Recent Value   Patient Availability and HIPAA Verification    Does patient want to proceed with activity? Yes   HIPAA/medical authority confirmed? Yes   Relationship to patient of person spoken to? Mother   Refill Screening Questions    Changes to allergies? No   Changes to medications? No   New conditions since last clinic visit? No   Unplanned office visit, urgent care, ED, or hospital admission in the last 4 weeks? No   How does patient/caregiver feel medication is working? Good   Financial problems or insurance changes? No   How many doses of your specialty medications were missed in the last 4 weeks? 0   Would patient like to speak to a pharmacist? No   When does the patient need to receive the medication? 04/05/22   Refill Delivery Questions    When does the patient need to receive the medication? 04/05/22   Shipping Address Home   Address in [facility] confirmed and updated if neccessary? Yes   Expected Copay ($) 0   Is the patient able to afford the medication copay? Yes   Payment Method zero copay   Days supply of Refill 28   Supplies needed? No supplies needed   Refill activity completed? Yes   Refill activity plan Refill scheduled   Shipment/Pickup Date: 03/31/22          Current Outpatient Medications   Medication Sig    cannabidioL (EPIDIOLEX) 100 mg/mL Take 1.75 mLs (175 mg total) by mouth 2 (two) times a day.    cloBAZam (ONFI) 20 mg Tab TAKE 1 TABLET(20 MG) BY MOUTH TWICE DAILY    clonazePAM (KLONOPIN) 2 MG Tab TAKE 1 TABLET BY MOUTH ONCE AS NEEDED FOR PROLONGED SEIZURES OR CLUSTER SEIZURES    hydrocortisone (CORTEF) 5 MG Tab Take 10 mg (2 tablets) in am and 5 mg in pm (3 pm)    lamotrigine XR (LAMICTAL XR) 300  "mg XR tablet TAKE 1 TABLET BY MOUTH TWICE DAILY    levothyroxine (SYNTHROID) 150 MCG tablet Take 1 tablet (150 mcg total) by mouth before breakfast.    loratadine (CLARITIN) 10 mg tablet Take 10 mg by mouth.    melatonin 10 mg Tab Take 10 mg by mouth.    methylphenidate HCl (RITALIN) 20 MG tablet Take 1 tablet (20 mg total) by mouth once daily AND 2 tablets (40 mg total) with lunch.    midazolam (NAYZILAM) 5 mg/spray (0.1 mL) Spry 5 mg by Nasal route as needed (to break seizure cluster. May repeat dose once at least 10 minutes after 1st dose.).    multivitamin (THERAGRAN) per tablet Take 1 tablet by mouth.    needle, disp, 18 G (BD PRECISIONGLIDE NON-STERILE) 18 gauge x 1 1/2" Ndle 1 Units by Misc.(Non-Drug; Combo Route) route 2 (two) times daily.    needle, disp, 23 gauge 23 gauge x 1 1/2" Ndle 1 Units by Misc.(Non-Drug; Combo Route) route 2 (two) times daily.    omeprazole (PRILOSEC) 20 MG capsule Take 20 mg by mouth.    rosuvastatin (CRESTOR) 20 MG tablet Take 20 mg by mouth once daily.    VIMPAT 200 mg Tab tablet TAKE 1 TABLET(200 MG) BY MOUTH EVERY 12 HOURS    vitamin D (VITAMIN D3) 1000 units Tab Take 1,000 Units by mouth.   Last reviewed on 1/10/2022  2:27 PM by Chaitanya Lamb MD    Review of patient's allergies indicates:  No Known Allergies Last reviewed on  3/28/2022 9:18 AM by Josefa Telles      Tasks added this encounter   4/26/2022 - Refill Call (Auto Added)   Tasks due within next 3 months   No tasks due.     Romero Dobbins, PharmD  [facility] - Specialty Pharmacy  [address] [place] [id number]  Phone: [phone]  Fax: [phone]      "

## 2022-04-18 ENCOUNTER — PATIENT MESSAGE (OUTPATIENT)
Dept: ADMINISTRATIVE | Facility: OTHER | Age: 32
End: 2022-04-18

## 2022-04-26 ENCOUNTER — SPECIALTY PHARMACY (OUTPATIENT)
Dept: PHARMACY | Facility: CLINIC | Age: 32
End: 2022-04-26

## 2022-04-26 NOTE — TELEPHONE ENCOUNTER
Specialty Pharmacy - Refill Coordination    Specialty Medication Orders Linked to Encounter    Flowsheet Row Most Recent Value   Medication #1 cannabidioL (EPIDIOLEX) 100 mg/mL (Order#[id number], Rx#[id number])        Refill Questions - Documented Responses    Flowsheet Row Most Recent Value   Patient Availability and HIPAA Verification    Does patient want to proceed with activity? Yes   HIPAA/medical authority confirmed? Yes   Relationship to patient of person spoken to? Mother   Refill Screening Questions    Changes to allergies? No   Changes to medications? No   New conditions since last clinic visit? No   Unplanned office visit, urgent care, ED, or hospital admission in the last 4 weeks? No   How does patient/caregiver feel medication is working? Very good   Financial problems or insurance changes? No   How many doses of your specialty medications were missed in the last 4 weeks? 0   Would patient like to speak to a pharmacist? No   When does the patient need to receive the medication? 05/02/22   Refill Delivery Questions    How will the patient receive the medication? Mail   When does the patient need to receive the medication? 05/02/22   Shipping Address Home   Address in [facility] confirmed and updated if neccessary? Yes   Expected Copay ($) 0   Is the patient able to afford the medication copay? Yes   Payment Method zero copay   Days supply of Refill 28   Supplies needed? No supplies needed   Refill activity completed? Yes   Refill activity plan Refill scheduled   Shipment/Pickup Date: 04/27/22        Current Outpatient Medications   Medication Sig    cannabidioL (EPIDIOLEX) 100 mg/mL Take 1.75 mLs (175 mg total) by mouth 2 (two) times a day.    cloBAZam (ONFI) 20 mg Tab TAKE 1 TABLET(20 MG) BY MOUTH TWICE DAILY    clonazePAM (KLONOPIN) 2 MG Tab TAKE 1 TABLET BY MOUTH ONCE AS NEEDED FOR PROLONGED SEIZURES OR CLUSTER SEIZURES    hydrocortisone (CORTEF) 5 MG Tab Take 10 mg (2 tablets) in am and 5  "mg in pm (3 pm)    lamotrigine XR (LAMICTAL XR) 300 mg XR tablet TAKE 1 TABLET BY MOUTH TWICE DAILY    levothyroxine (SYNTHROID) 150 MCG tablet Take 1 tablet (150 mcg total) by mouth before breakfast.    loratadine (CLARITIN) 10 mg tablet Take 10 mg by mouth.    melatonin 10 mg Tab Take 10 mg by mouth.    methylphenidate HCl (RITALIN) 20 MG tablet Take 1 tablet (20 mg total) by mouth once daily AND 2 tablets (40 mg total) with lunch.    midazolam (NAYZILAM) 5 mg/spray (0.1 mL) Spry 5 mg by Nasal route as needed (to break seizure cluster. May repeat dose once at least 10 minutes after 1st dose.).    multivitamin (THERAGRAN) per tablet Take 1 tablet by mouth.    needle, disp, 18 G (BD PRECISIONGLIDE NON-STERILE) 18 gauge x 1 1/2" Ndle 1 Units by Misc.(Non-Drug; Combo Route) route 2 (two) times daily.    needle, disp, 23 gauge 23 gauge x 1 1/2" Ndle 1 Units by Misc.(Non-Drug; Combo Route) route 2 (two) times daily.    omeprazole (PRILOSEC) 20 MG capsule Take 20 mg by mouth.    rosuvastatin (CRESTOR) 20 MG tablet Take 20 mg by mouth once daily.    VIMPAT 200 mg Tab tablet TAKE 1 TABLET(200 MG) BY MOUTH EVERY 12 HOURS    vitamin D (VITAMIN D3) 1000 units Tab Take 1,000 Units by mouth.   Last reviewed on 1/10/2022  2:27 PM by Chaitanya Lamb MD    Review of patient's allergies indicates:  No Known Allergies Last reviewed on  4/18/2022 1:11 PM by Josefa Telles      Tasks added this encounter   5/23/2022 - Refill Call (Auto Added)   Tasks due within next 3 months   No tasks due.     Romero Dobbins, PharmD  [facility]  [address] [place] [id number]  Phone: [phone]  Fax: [phone]      "

## 2022-05-23 ENCOUNTER — SPECIALTY PHARMACY (OUTPATIENT)
Dept: PHARMACY | Facility: CLINIC | Age: 32
End: 2022-05-23

## 2022-05-23 NOTE — TELEPHONE ENCOUNTER
Specialty Pharmacy - Refill Coordination    Specialty Medication Orders Linked to Encounter    Flowsheet Row Most Recent Value   Medication #1 cannabidioL (EPIDIOLEX) 100 mg/mL (Order#[id number], Rx#[id number])        Refill Questions - Documented Responses    Flowsheet Row Most Recent Value   Patient Availability and HIPAA Verification    Does patient want to proceed with activity? Yes   HIPAA/medical authority confirmed? Yes   Relationship to patient of person spoken to? Mother   Refill Screening Questions    Changes to allergies? No   Changes to medications? No   New conditions since last clinic visit? No   Unplanned office visit, urgent care, ED, or hospital admission in the last 4 weeks? No   How does patient/caregiver feel medication is working? Good   Financial problems or insurance changes? No   How many doses of your specialty medications were missed in the last 4 weeks? 0   Would patient like to speak to a pharmacist? No   When does the patient need to receive the medication? 05/30/22   Refill Delivery Questions    How will the patient receive the medication? Delivery Juli   When does the patient need to receive the medication? 05/30/22   Shipping Address Home   Address in [facility] confirmed and updated if neccessary? Yes   Expected Copay ($) 0   Is the patient able to afford the medication copay? Yes   Payment Method zero copay   Days supply of Refill 28   Supplies needed? No supplies needed   Refill activity completed? Yes   Refill activity plan Refill scheduled   Shipment/Pickup Date: 05/25/22          Current Outpatient Medications   Medication Sig    cannabidioL (EPIDIOLEX) 100 mg/mL Take 1.75 mLs (175 mg total) by mouth 2 (two) times a day.    cloBAZam (ONFI) 20 mg Tab TAKE 1 TABLET(20 MG) BY MOUTH TWICE DAILY    clonazePAM (KLONOPIN) 2 MG Tab TAKE 1 TABLET BY MOUTH ONCE AS NEEDED FOR PROLONGED SEIZURES OR CLUSTER SEIZURES    hydrocortisone (CORTEF) 5 MG Tab Take 10 mg (2 tablets) in am  "and 5 mg in pm (3 pm)    lamotrigine XR (LAMICTAL XR) 300 mg XR tablet TAKE 1 TABLET BY MOUTH TWICE DAILY    levothyroxine (SYNTHROID) 150 MCG tablet Take 1 tablet (150 mcg total) by mouth before breakfast.    loratadine (CLARITIN) 10 mg tablet Take 10 mg by mouth.    melatonin 10 mg Tab Take 10 mg by mouth.    methylphenidate HCl (RITALIN) 20 MG tablet Take 1 tablet (20 mg total) by mouth once daily AND 2 tablets (40 mg total) with lunch.    midazolam (NAYZILAM) 5 mg/spray (0.1 mL) Spry 5 mg by Nasal route as needed (to break seizure cluster. May repeat dose once at least 10 minutes after 1st dose.).    multivitamin (THERAGRAN) per tablet Take 1 tablet by mouth.    needle, disp, 18 G (BD PRECISIONGLIDE NON-STERILE) 18 gauge x 1 1/2" Ndle 1 Units by Misc.(Non-Drug; Combo Route) route 2 (two) times daily.    needle, disp, 23 gauge 23 gauge x 1 1/2" Ndle 1 Units by Misc.(Non-Drug; Combo Route) route 2 (two) times daily.    omeprazole (PRILOSEC) 20 MG capsule Take 20 mg by mouth.    rosuvastatin (CRESTOR) 20 MG tablet Take 20 mg by mouth once daily.    VIMPAT 200 mg Tab tablet TAKE 1 TABLET(200 MG) BY MOUTH EVERY 12 HOURS    vitamin D (VITAMIN D3) 1000 units Tab Take 1,000 Units by mouth.   Last reviewed on 1/10/2022  2:27 PM by Chaitanya Lamb MD    Review of patient's allergies indicates:  No Known Allergies Last reviewed on  4/18/2022 1:11 PM by Josefa Telles      Tasks added this encounter   6/20/2022 - Refill Call (Auto Added)   Tasks due within next 3 months   No tasks due.     Romero Dobbins, PharmD  [facility]  [address] [place] [id number]  Phone: [phone]  Fax: [phone]      "

## 2022-06-20 ENCOUNTER — SPECIALTY PHARMACY (OUTPATIENT)
Dept: PHARMACY | Facility: CLINIC | Age: 32
End: 2022-06-20

## 2022-06-20 NOTE — TELEPHONE ENCOUNTER
Specialty Pharmacy - Refill Coordination    Specialty Medication Orders Linked to Encounter    Flowsheet Row Most Recent Value   Medication #1 cannabidioL (EPIDIOLEX) 100 mg/mL (Order#[id number], Rx#[id number])          Refill Questions - Documented Responses    Flowsheet Row Most Recent Value   Patient Availability and HIPAA Verification    Does patient want to proceed with activity? Yes   HIPAA/medical authority confirmed? Yes   Relationship to patient of person spoken to? Mother   Refill Screening Questions    Changes to allergies? No   Changes to medications? No   New conditions since last clinic visit? No   Unplanned office visit, urgent care, ED, or hospital admission in the last 4 weeks? No   How does patient/caregiver feel medication is working? Good   Financial problems or insurance changes? No   How many doses of your specialty medications were missed in the last 4 weeks? 1   Would patient like to speak to a pharmacist? No   When does the patient need to receive the medication? 06/27/22   Refill Delivery Questions    How will the patient receive the medication? Mail   When does the patient need to receive the medication? 06/27/22   Shipping Address Temporary   Address in [facility] confirmed and updated if neccessary? Yes   Expected Copay ($) 0   Is the patient able to afford the medication copay? Yes   Payment Method zero copay   Days supply of Refill 28   Supplies needed? No supplies needed   Refill activity completed? Yes   Refill activity plan Refill scheduled   Shipment/Pickup Date: 06/21/22          Current Outpatient Medications   Medication Sig    cannabidioL (EPIDIOLEX) 100 mg/mL Take 1.75 mLs (175 mg total) by mouth 2 (two) times a day.    cloBAZam (ONFI) 20 mg Tab TAKE 1 TABLET(20 MG) BY MOUTH TWICE DAILY    clonazePAM (KLONOPIN) 2 MG Tab TAKE 1 TABLET BY MOUTH ONCE AS NEEDED FOR PROLONGED SEIZURES OR CLUSTER SEIZURES    hydrocortisone (CORTEF) 5 MG Tab Take 10 mg (2 tablets) in am  "and 5 mg in pm (3 pm)    lamotrigine XR (LAMICTAL XR) 300 mg XR tablet TAKE 1 TABLET BY MOUTH TWICE DAILY    levothyroxine (SYNTHROID) 150 MCG tablet Take 1 tablet (150 mcg total) by mouth before breakfast.    loratadine (CLARITIN) 10 mg tablet Take 10 mg by mouth.    melatonin 10 mg Tab Take 10 mg by mouth.    methylphenidate HCl (RITALIN) 20 MG tablet Take 1 tablet (20 mg total) by mouth once daily AND 2 tablets (40 mg total) with lunch.    midazolam (NAYZILAM) 5 mg/spray (0.1 mL) Spry 5 mg by Nasal route as needed (to break seizure cluster. May repeat dose once at least 10 minutes after 1st dose.).    multivitamin (THERAGRAN) per tablet Take 1 tablet by mouth.    needle, disp, 18 G (BD PRECISIONGLIDE NON-STERILE) 18 gauge x 1 1/2" Ndle 1 Units by Misc.(Non-Drug; Combo Route) route 2 (two) times daily.    needle, disp, 23 gauge 23 gauge x 1 1/2" Ndle 1 Units by Misc.(Non-Drug; Combo Route) route 2 (two) times daily.    omeprazole (PRILOSEC) 20 MG capsule Take 20 mg by mouth.    rosuvastatin (CRESTOR) 20 MG tablet Take 20 mg by mouth once daily.    VIMPAT 200 mg Tab tablet TAKE 1 TABLET(200 MG) BY MOUTH EVERY 12 HOURS    vitamin D (VITAMIN D3) 1000 units Tab Take 1,000 Units by mouth.   Last reviewed on 1/10/2022  2:27 PM by Chaitanya Lamb MD    Review of patient's allergies indicates:  No Known Allergies Last reviewed on  4/18/2022 1:11 PM by Josefa Telles      Tasks added this encounter   7/18/2022 - Refill Call (Auto Added)   Tasks due within next 3 months   No tasks due.     Mary Lou Palacios, PharmD  [facility]  [address] [place] [id number]  Phone: [phone]  Fax: [phone]      "

## 2022-07-18 ENCOUNTER — SPECIALTY PHARMACY (OUTPATIENT)
Dept: PHARMACY | Facility: CLINIC | Age: 32
End: 2022-07-18

## 2022-07-18 NOTE — TELEPHONE ENCOUNTER
Specialty Pharmacy - Refill Coordination    Specialty Medication Orders Linked to Encounter    Flowsheet Row Most Recent Value   Medication #1 cannabidioL (EPIDIOLEX) 100 mg/mL (Order#[id number], Rx#[id number])          Refill Questions - Documented Responses    Flowsheet Row Most Recent Value   Patient Availability and HIPAA Verification    Does patient want to proceed with activity? Yes   HIPAA/medical authority confirmed? Yes   Relationship to patient of person spoken to? Mother   Refill Screening Questions    Changes to allergies? No   Changes to medications? No   New conditions since last clinic visit? No   Unplanned office visit, urgent care, ED, or hospital admission in the last 4 weeks? No   How does patient/caregiver feel medication is working? Good   Financial problems or insurance changes? No   How many doses of your specialty medications were missed in the last 4 weeks? 0   Would patient like to speak to a pharmacist? No   When does the patient need to receive the medication? 07/26/22   Refill Delivery Questions    How will the patient receive the medication? Mail   When does the patient need to receive the medication? 07/26/22   Shipping Address Temporary   Address in [facility] confirmed and updated if neccessary? Yes   Expected Copay ($) 0   Is the patient able to afford the medication copay? Yes   Payment Method zero copay   Days supply of Refill 28   Supplies needed? No supplies needed   Refill activity completed? Yes   Refill activity plan Refill scheduled   Shipment/Pickup Date: 07/20/22          Current Outpatient Medications   Medication Sig    cannabidioL (EPIDIOLEX) 100 mg/mL Take 1.75 mLs (175 mg total) by mouth 2 (two) times a day.    cloBAZam (ONFI) 20 mg Tab TAKE 1 TABLET(20 MG) BY MOUTH TWICE DAILY    hydrocortisone (CORTEF) 5 MG Tab Take 10 mg (2 tablets) in am and 5 mg in pm (3 pm)    lacosamide (VIMPAT) 200 mg Tab tablet TAKE 1 TABLET(200 MG) BY MOUTH EVERY 12 HOURS     "lamotrigine XR (LAMICTAL XR) 300 mg XR tablet TAKE 1 TABLET BY MOUTH TWICE DAILY    levothyroxine (SYNTHROID) 150 MCG tablet Take 1 tablet (150 mcg total) by mouth before breakfast.    loratadine (CLARITIN) 10 mg tablet Take 10 mg by mouth.    melatonin 10 mg Tab Take 10 mg by mouth.    methylphenidate HCl (RITALIN) 20 MG tablet Take 1 tablet (20 mg total) by mouth once daily AND 2 tablets (40 mg total) with lunch.    midazolam (NAYZILAM) 5 mg/spray (0.1 mL) Spry 5 mg by Nasal route as needed (to break seizure cluster. May repeat dose once at least 10 minutes after 1st dose.).    multivitamin (THERAGRAN) per tablet Take 1 tablet by mouth.    needle, disp, 18 G (BD PRECISIONGLIDE NON-STERILE) 18 gauge x 1 1/2" Ndle 1 Units by Misc.(Non-Drug; Combo Route) route 2 (two) times daily.    needle, disp, 23 gauge 23 gauge x 1 1/2" Ndle 1 Units by Misc.(Non-Drug; Combo Route) route 2 (two) times daily.    omeprazole (PRILOSEC) 20 MG capsule Take 20 mg by mouth.    rosuvastatin (CRESTOR) 20 MG tablet Take 20 mg by mouth once daily.    vitamin D (VITAMIN D3) 1000 units Tab Take 1,000 Units by mouth.   Last reviewed on 1/10/2022  2:27 PM by Chaitanya Lamb MD    Review of patient's allergies indicates:  No Known Allergies Last reviewed on  6/23/2022 1:27 PM by Josefa Telles      Tasks added this encounter   8/16/2022 - Refill Call (Auto Added)   Tasks due within next 3 months   No tasks due.     Mary Lou Paalcios, PharmD  [facility]  [address] [facility] [address]  Phone: [phone]  Fax: [phone]      "

## 2022-08-16 ENCOUNTER — SPECIALTY PHARMACY (OUTPATIENT)
Dept: PHARMACY | Facility: CLINIC | Age: 32
End: 2022-08-16

## 2022-08-16 NOTE — TELEPHONE ENCOUNTER
Called patient's mother for Epidiolex refill. She states they have ~a half bottle on hand at this time (14 days). She denies any missed doses and confirms dosing of 1.75mL BID. Patient's mother agreed for a call back in 1 week to schedule.

## 2022-08-22 NOTE — TELEPHONE ENCOUNTER
Specialty Pharmacy - Refill Coordination    Specialty Medication Orders Linked to Encounter    Flowsheet Row Most Recent Value   Medication #1 cannabidioL (EPIDIOLEX) 100 mg/mL (Order#[id number], Rx#[id number])          Refill Questions - Documented Responses    Flowsheet Row Most Recent Value   Patient Availability and HIPAA Verification    Does patient want to proceed with activity? Yes   HIPAA/medical authority confirmed? Yes   Relationship to patient of person spoken to? Mother   Refill Screening Questions    Changes to allergies? No   Changes to medications? No   New conditions since last clinic visit? No   Unplanned office visit, urgent care, ED, or hospital admission in the last 4 weeks? No   How does patient/caregiver feel medication is working? Good   Financial problems or insurance changes? No   How many doses of your specialty medications were missed in the last 4 weeks? 0   Would patient like to speak to a pharmacist? No   When does the patient need to receive the medication? 08/26/22   Refill Delivery Questions    How will the patient receive the medication? Mail   When does the patient need to receive the medication? 08/26/22   Shipping Address Home   Address in [facility] confirmed and updated if neccessary? Yes   Expected Copay ($) 0   Is the patient able to afford the medication copay? Yes   Payment Method zero copay   Days supply of Refill 28   Supplies needed? No supplies needed   Refill activity completed? Yes   Refill activity plan Refill scheduled   Shipment/Pickup Date: 08/23/22          Current Outpatient Medications   Medication Sig    cannabidioL (EPIDIOLEX) 100 mg/mL Take 1.75 mLs (175 mg total) by mouth 2 (two) times a day.    cloBAZam (ONFI) 20 mg Tab TAKE 1 TABLET(20 MG) BY MOUTH TWICE DAILY    hydrocortisone (CORTEF) 5 MG Tab Take 10 mg (2 tablets) in am and 5 mg in pm (3 pm)    lacosamide (VIMPAT) 200 mg Tab tablet TAKE 1 TABLET(200 MG) BY MOUTH EVERY 12 HOURS     "lamotrigine XR (LAMICTAL XR) 300 mg XR tablet TAKE 1 TABLET BY MOUTH TWICE DAILY    levothyroxine (SYNTHROID) 150 MCG tablet Take 1 tablet (150 mcg total) by mouth before breakfast.    loratadine (CLARITIN) 10 mg tablet Take 10 mg by mouth.    melatonin 10 mg Tab Take 10 mg by mouth.    methylphenidate HCl (RITALIN) 20 MG tablet Take 1 tablet (20 mg total) by mouth once daily AND 2 tablets (40 mg total) with lunch.    midazolam (NAYZILAM) 5 mg/spray (0.1 mL) Spry 5 mg by Nasal route as needed (to break seizure cluster. May repeat dose once at least 10 minutes after 1st dose.).    multivitamin (THERAGRAN) per tablet Take 1 tablet by mouth.    needle, disp, 18 G (BD PRECISIONGLIDE NON-STERILE) 18 gauge x 1 1/2" Ndle 1 Units by Misc.(Non-Drug; Combo Route) route 2 (two) times daily.    needle, disp, 23 gauge 23 gauge x 1 1/2" Ndle 1 Units by Misc.(Non-Drug; Combo Route) route 2 (two) times daily.    omeprazole (PRILOSEC) 20 MG capsule Take 20 mg by mouth.    rosuvastatin (CRESTOR) 20 MG tablet Take 20 mg by mouth once daily.    testosterone cypionate (DEPOTESTOTERONE CYPIONATE) 200 mg/mL injection Inject 0.5 mLs (100 mg total) into the muscle every 14 (fourteen) days.    vitamin D (VITAMIN D3) 1000 units Tab Take 1,000 Units by mouth.   Last reviewed on 1/10/2022  2:27 PM by Chaitanya Lamb MD    Review of patient's allergies indicates:  No Known Allergies Last reviewed on  6/23/2022 1:27 PM by Josefa Telles      Tasks added this encounter   9/20/2022 - Refill Call (Auto Added)   Tasks due within next 3 months   No tasks due.     Cynthia Wiggins, PharmD  [facility]  [address] [place] [address]  Phone: [phone]  Fax: [phone]      "

## 2022-09-20 ENCOUNTER — SPECIALTY PHARMACY (OUTPATIENT)
Dept: PHARMACY | Facility: CLINIC | Age: 32
End: 2022-09-20

## 2022-09-20 NOTE — TELEPHONE ENCOUNTER
Epidiolex refill request sent to [facility]. Patient's mother reports 7 days of medication on hand at this time. Will reach out to schedule once refills are obtained. Patient's mother verbalized understanding.

## 2022-09-21 NOTE — TELEPHONE ENCOUNTER
Specialty Pharmacy - Refill Coordination    Specialty Medication Orders Linked to Encounter      Flowsheet Row Most Recent Value   Medication #1 cannabidioL (EPIDIOLEX) 100 mg/mL (Order#[id number], Rx#[id number])          Refill Questions - Documented Responses      Flowsheet Row Most Recent Value   Patient Availability and HIPAA Verification    Does patient want to proceed with activity? Yes   HIPAA/medical authority confirmed? Yes   Relationship to patient of person spoken to? Mother   Refill Screening Questions    Changes to allergies? No   Changes to medications? No   New conditions since last clinic visit? No   Unplanned office visit, urgent care, ED, or hospital admission in the last 4 weeks? No   How does patient/caregiver feel medication is working? Good   Financial problems or insurance changes? No   How many doses of your specialty medications were missed in the last 4 weeks? 1   Why were doses missed? Felt ill or sick  [threw up one dose]   Would patient like to speak to a pharmacist? No   When does the patient need to receive the medication? 09/27/22   Refill Delivery Questions    How will the patient receive the medication? Mail   When does the patient need to receive the medication? 09/27/22   Shipping Address Home   Address in [facility] confirmed and updated if neccessary? Yes   Expected Copay ($) 0   Is the patient able to afford the medication copay? Yes   Payment Method zero copay   Days supply of Refill 28   Supplies needed? No supplies needed   Refill activity completed? Yes   Refill activity plan Refill scheduled   Shipment/Pickup Date: 09/22/22          Current Outpatient Medications   Medication Sig    cannabidioL (EPIDIOLEX) 100 mg/mL Take 1.75 mLs (175 mg total) by mouth 2 (two) times a day.    cloBAZam (ONFI) 20 mg Tab TAKE 1 TABLET(20 MG) BY MOUTH TWICE DAILY    hydrocortisone (CORTEF) 5 MG Tab Take 10 mg (2 tablets) in am and 5 mg in pm (3 pm)    lacosamide (VIMPAT) 200 mg Tab  "tablet TAKE 1 TABLET(200 MG) BY MOUTH EVERY 12 HOURS    lamotrigine XR (LAMICTAL XR) 300 mg XR tablet TAKE 1 TABLET BY MOUTH TWICE DAILY    levothyroxine (SYNTHROID) 150 MCG tablet Take 1 tablet (150 mcg total) by mouth before breakfast.    loratadine (CLARITIN) 10 mg tablet Take 10 mg by mouth.    melatonin 10 mg Tab Take 10 mg by mouth.    methylphenidate HCl (RITALIN) 20 MG tablet Take 1 tablet (20 mg total) by mouth once daily AND 2 tablets (40 mg total) with lunch.    midazolam (NAYZILAM) 5 mg/spray (0.1 mL) Spry 5 mg by Nasal route as needed (to break seizure cluster. May repeat dose once at least 10 minutes after 1st dose.).    multivitamin (THERAGRAN) per tablet Take 1 tablet by mouth.    needle, disp, 18 G (BD PRECISIONGLIDE NON-STERILE) 18 gauge x 1 1/2" Ndle 1 Units by Misc.(Non-Drug; Combo Route) route 2 (two) times daily.    needle, disp, 23 gauge 23 gauge x 1 1/2" Ndle 1 Units by Misc.(Non-Drug; Combo Route) route 2 (two) times daily.    omeprazole (PRILOSEC) 20 MG capsule Take 20 mg by mouth.    rosuvastatin (CRESTOR) 20 MG tablet Take 20 mg by mouth once daily.    testosterone cypionate (DEPOTESTOTERONE CYPIONATE) 200 mg/mL injection Inject 0.5 mLs (100 mg total) into the muscle every 14 (fourteen) days.    vitamin D (VITAMIN D3) 1000 units Tab Take 1,000 Units by mouth.   Last reviewed on 1/10/2022  2:27 PM by Chaitanya Lamb MD    Review of patient's allergies indicates:  No Known Allergies Last reviewed on  6/23/2022 1:27 PM by Josefa Telles    Tasks added this encounter   10/18/2022 - Refill Call (Auto Added)   Tasks due within next 3 months   No tasks due.     Romero Dobbins, PharmD  [facility]  [address] [facility] [address]  Phone: [phone]  Fax: [phone]        "

## 2022-10-18 ENCOUNTER — SPECIALTY PHARMACY (OUTPATIENT)
Dept: PHARMACY | Facility: CLINIC | Age: 32
End: 2022-10-18

## 2022-10-18 NOTE — TELEPHONE ENCOUNTER
Specialty Pharmacy - Refill Coordination    Specialty Medication Orders Linked to Encounter      Flowsheet Row Most Recent Value   Medication #1 cannabidioL (EPIDIOLEX) 100 mg/mL (Order#[id number], Rx#[id number])          Refill Questions - Documented Responses      Flowsheet Row Most Recent Value   Patient Availability and HIPAA Verification    Does patient want to proceed with activity? Yes   HIPAA/medical authority confirmed? Yes   Relationship to patient of person spoken to? Mother   Refill Screening Questions    Changes to allergies? No   Changes to medications? No   New conditions since last clinic visit? No   Unplanned office visit, urgent care, ED, or hospital admission in the last 4 weeks? No   How does patient/caregiver feel medication is working? Good   Financial problems or insurance changes? No   How many doses of your specialty medications were missed in the last 4 weeks? 0   Would patient like to speak to a pharmacist? No   When does the patient need to receive the medication? 10/25/22   Refill Delivery Questions    How will the patient receive the medication? Mail   When does the patient need to receive the medication? 10/25/22   Shipping Address Home   Address in [facility] confirmed and updated if neccessary? Yes   Expected Copay ($) 0   Is the patient able to afford the medication copay? Yes   Payment Method zero copay   Days supply of Refill 28   Supplies needed? No supplies needed   Refill activity completed? Yes   Refill activity plan Refill scheduled   Shipment/Pickup Date: 10/20/22            Current Outpatient Medications   Medication Sig    cannabidioL (EPIDIOLEX) 100 mg/mL Take 1.75 mLs (175 mg total) by mouth 2 (two) times a day.    cloBAZam (ONFI) 20 mg Tab TAKE 1 TABLET(20 MG) BY MOUTH TWICE DAILY    hydrocortisone (CORTEF) 5 MG Tab Take 10 mg (2 tablets) in am and 5 mg in pm (3 pm)    lacosamide (VIMPAT) 200 mg Tab tablet TAKE 1 TABLET(200 MG) BY MOUTH EVERY 12 HOURS     "lamotrigine XR (LAMICTAL XR) 300 mg XR tablet TAKE 1 TABLET BY MOUTH TWICE DAILY    levothyroxine (SYNTHROID) 150 MCG tablet Take 1 tablet (150 mcg total) by mouth before breakfast.    loratadine (CLARITIN) 10 mg tablet Take 10 mg by mouth.    melatonin 10 mg Tab Take 10 mg by mouth.    methylphenidate HCl (RITALIN) 20 MG tablet Take 1 tablet (20 mg total) by mouth once daily AND 2 tablets (40 mg total) with lunch.    midazolam (NAYZILAM) 5 mg/spray (0.1 mL) Spry 5 mg by Nasal route as needed (to break seizure cluster. May repeat dose once at least 10 minutes after 1st dose.).    multivitamin (THERAGRAN) per tablet Take 1 tablet by mouth.    needle, disp, 18 G (BD PRECISIONGLIDE NON-STERILE) 18 gauge x 1 1/2" Ndle 1 Units by Misc.(Non-Drug; Combo Route) route 2 (two) times daily.    needle, disp, 23 gauge 23 gauge x 1 1/2" Ndle 1 Units by Misc.(Non-Drug; Combo Route) route 2 (two) times daily.    omeprazole (PRILOSEC) 20 MG capsule Take 20 mg by mouth.    rosuvastatin (CRESTOR) 20 MG tablet Take 20 mg by mouth once daily.    testosterone cypionate (DEPOTESTOTERONE CYPIONATE) 200 mg/mL injection Inject 0.5 mLs (100 mg total) into the muscle every 14 (fourteen) days.    vitamin D (VITAMIN D3) 1000 units Tab Take 1,000 Units by mouth.   Last reviewed on 1/10/2022  2:27 PM by Chaitanya Lamb MD    Review of patient's allergies indicates:  No Known Allergies Last reviewed on  6/23/2022 1:27 PM by Josefa Telles      Tasks added this encounter   11/15/2022 - Refill Call (Auto Added)   Tasks due within next 3 months   No tasks due.     Romero Dobbins, PharmD  [facility]  [address] [place] [address]  Phone: [phone]  Fax: [phone]        "

## 2022-11-17 ENCOUNTER — SPECIALTY PHARMACY (OUTPATIENT)
Dept: PHARMACY | Facility: CLINIC | Age: 32
End: 2022-11-17

## 2022-11-17 NOTE — TELEPHONE ENCOUNTER
Specialty Pharmacy - Refill Coordination    Specialty Medication Orders Linked to Encounter      Flowsheet Row Most Recent Value   Medication #1 cannabidioL (EPIDIOLEX) 100 mg/mL (Order#[id number], Rx#[id number])          Refill Questions - Documented Responses      Flowsheet Row Most Recent Value   Patient Availability and HIPAA Verification    Does patient want to proceed with activity? Yes   HIPAA/medical authority confirmed? Yes   Relationship to patient of person spoken to? Mother   Refill Screening Questions    Changes to allergies? No   Changes to medications? No   New conditions since last clinic visit? No   Unplanned office visit, urgent care, ED, or hospital admission in the last 4 weeks? No   How does patient/caregiver feel medication is working? Good   Financial problems or insurance changes? No   How many doses of your specialty medications were missed in the last 4 weeks? 0   Would patient like to speak to a pharmacist? No   When does the patient need to receive the medication? 11/26/22   Refill Delivery Questions    How will the patient receive the medication? Mail   When does the patient need to receive the medication? 11/26/22   Shipping Address Home   Address in [facility] confirmed and updated if neccessary? Yes   Expected Copay ($) 0   Is the patient able to afford the medication copay? Yes   Payment Method zero copay   Days supply of Refill 28   Supplies needed? No supplies needed   Refill activity completed? Yes   Refill activity plan Refill scheduled   Shipment/Pickup Date: 11/22/22            Current Outpatient Medications   Medication Sig    cannabidioL (EPIDIOLEX) 100 mg/mL Take 1.75 mLs (175 mg total) by mouth 2 (two) times a day.    cloBAZam (ONFI) 20 mg Tab TAKE 1 TABLET(20 MG) BY MOUTH TWICE DAILY    hydrocortisone (CORTEF) 5 MG Tab Take 10 mg (2 tablets) in am and 5 mg in pm (3 pm)    lacosamide (VIMPAT) 200 mg Tab tablet TAKE 1 TABLET(200 MG) BY MOUTH EVERY 12 HOURS     "lamotrigine XR (LAMICTAL XR) 300 mg XR tablet TAKE 1 TABLET BY MOUTH TWICE DAILY    levothyroxine (SYNTHROID) 150 MCG tablet Take 1 tablet (150 mcg total) by mouth before breakfast.    loratadine (CLARITIN) 10 mg tablet Take 10 mg by mouth.    melatonin 10 mg Tab Take 10 mg by mouth.    methylphenidate HCl (RITALIN) 20 MG tablet Take 1 tablet (20 mg total) by mouth once daily AND 2 tablets (40 mg total) with lunch.    midazolam (NAYZILAM) 5 mg/spray (0.1 mL) Spry 5 mg by Nasal route as needed (to break seizure cluster. May repeat dose once at least 10 minutes after 1st dose.).    multivitamin (THERAGRAN) per tablet Take 1 tablet by mouth.    needle, disp, 18 G (BD PRECISIONGLIDE NON-STERILE) 18 gauge x 1 1/2" Ndle 1 Units by Misc.(Non-Drug; Combo Route) route 2 (two) times daily.    needle, disp, 23 gauge 23 gauge x 1 1/2" Ndle 1 Units by Misc.(Non-Drug; Combo Route) route 2 (two) times daily.    omeprazole (PRILOSEC) 20 MG capsule Take 20 mg by mouth.    rosuvastatin (CRESTOR) 20 MG tablet Take 20 mg by mouth once daily.    testosterone cypionate (DEPOTESTOTERONE CYPIONATE) 200 mg/mL injection Inject 0.5 mLs (100 mg total) into the muscle every 14 (fourteen) days.    vitamin D (VITAMIN D3) 1000 units Tab Take 1,000 Units by mouth.   Last reviewed on 1/10/2022  2:27 PM by Chaitanya Lamb MD    Review of patient's allergies indicates:  No Known Allergies Last reviewed on  6/23/2022 1:27 PM by Josefa Telles      Tasks added this encounter   12/16/2022 - Refill Call (Auto Added)   Tasks due within next 3 months   No tasks due.     Romero Dobbins, PharmD  [facility]  [address] [place] [id number]  Phone: [phone]  Fax: [phone]        "

## 2022-12-16 ENCOUNTER — SPECIALTY PHARMACY (OUTPATIENT)
Dept: PHARMACY | Facility: CLINIC | Age: 32
End: 2022-12-16

## 2022-12-16 NOTE — TELEPHONE ENCOUNTER
Specialty Pharmacy - Refill Coordination    Specialty Medication Orders Linked to Encounter      Flowsheet Row Most Recent Value   Medication #1 cannabidioL (EPIDIOLEX) 100 mg/mL (Order#[id number], Rx#[id number])            Refill Questions - Documented Responses      Flowsheet Row Most Recent Value   Patient Availability and HIPAA Verification    Does patient want to proceed with activity? Yes   HIPAA/medical authority confirmed? Yes   Relationship to patient of person spoken to? Mother   Refill Screening Questions    Changes to allergies? No   Changes to medications? No   New conditions since last clinic visit? No   Unplanned office visit, urgent care, ED, or hospital admission in the last 4 weeks? No   How does patient/caregiver feel medication is working? Good   Financial problems or insurance changes? No   How many doses of your specialty medications were missed in the last 4 weeks? 0   Would patient like to speak to a pharmacist? No   When does the patient need to receive the medication? 12/28/22   Refill Delivery Questions    How will the patient receive the medication? Mail   When does the patient need to receive the medication? 12/28/22   Shipping Address Home   Address in [facility] confirmed and updated if neccessary? Yes   Expected Copay ($) 0   Is the patient able to afford the medication copay? Yes   Payment Method zero copay   Days supply of Refill 28   Supplies needed? No supplies needed   Refill activity completed? Yes   Refill activity plan Refill scheduled   Shipment/Pickup Date: 12/19/22            Current Outpatient Medications   Medication Sig    cannabidioL (EPIDIOLEX) 100 mg/mL Take 1.75 mLs (175 mg total) by mouth 2 (two) times a day.    cloBAZam (ONFI) 20 mg Tab Take 1 tablet (20 mg total) by mouth 2 (two) times daily.    hydrocortisone (CORTEF) 5 MG Tab Take 10 mg (2 tablets) in am and 5 mg in pm (3 pm)    lacosamide (VIMPAT) 200 mg Tab tablet TAKE 1 TABLET(200 MG) BY MOUTH EVERY 12  "HOURS    lamotrigine XR (LAMICTAL XR) 300 mg XR tablet TAKE 1 TABLET BY MOUTH TWICE DAILY    levothyroxine (SYNTHROID) 150 MCG tablet Take 1 tablet (150 mcg total) by mouth before breakfast.    loratadine (CLARITIN) 10 mg tablet Take 10 mg by mouth.    melatonin 10 mg Tab Take 10 mg by mouth.    methylphenidate HCl (RITALIN) 20 MG tablet Take 1 tablet (20 mg total) by mouth once daily AND 2 tablets (40 mg total) with lunch.    midazolam (NAYZILAM) 5 mg/spray (0.1 mL) Spry 5 mg by Nasal route as needed (to break seizure cluster. May repeat dose once at least 10 minutes after 1st dose.).    multivitamin (THERAGRAN) per tablet Take 1 tablet by mouth.    needle, disp, 18 G (BD PRECISIONGLIDE NON-STERILE) 18 gauge x 1 1/2" Ndle 1 Units by Misc.(Non-Drug; Combo Route) route 2 (two) times daily.    needle, disp, 23 gauge 23 gauge x 1 1/2" Ndle 1 Units by Misc.(Non-Drug; Combo Route) route 2 (two) times daily.    omeprazole (PRILOSEC) 20 MG capsule Take 20 mg by mouth.    rosuvastatin (CRESTOR) 20 MG tablet Take 20 mg by mouth once daily.    testosterone cypionate (DEPOTESTOTERONE CYPIONATE) 200 mg/mL injection Inject 0.5 mLs (100 mg total) into the muscle every 14 (fourteen) days.    vitamin D (VITAMIN D3) 1000 units Tab Take 1,000 Units by mouth.   Last reviewed on 1/10/2022  2:27 PM by Chaitanya Lamb MD    Review of patient's allergies indicates:  No Known Allergies Last reviewed on  12/8/2022 9:14 AM by Josefa Telles      Tasks added this encounter   1/18/2023 - Refill Call (Auto Added)   Tasks due within next 3 months   No tasks due.     Mary Lou Palacios, PharmD  [facility]  [address] [place] [address]  Phone: [phone]  Fax: [phone]        "

## 2023-01-18 ENCOUNTER — SPECIALTY PHARMACY (OUTPATIENT)
Dept: PHARMACY | Facility: CLINIC | Age: 33
End: 2023-01-18

## 2023-01-18 NOTE — TELEPHONE ENCOUNTER
Epidiolex Rx discontinued and sent to [facility] in [place], [place]. Patient's mother reached and states they have been back and forth from [place]. They will be moving there officially once they close on their house. In the meantime, [facility] will continue to deliver. Discussed Rx will have to be triaged to an alternate pharmacy that can ship to [place] once they officially move. Patient's mother verbalized understanding and reports 7 days of medication on hand. Requested new Rx from [facility].

## 2023-01-23 NOTE — TELEPHONE ENCOUNTER
Outgoing call to patient - Mother Shahida. Patient now has [facility] Medicaid. They have an active Rx in [place] and they are currently working on getting approval through his medicaid and would like referral to be closed out here at [facility]. Closing Referral